# Patient Record
Sex: FEMALE | Race: WHITE
[De-identification: names, ages, dates, MRNs, and addresses within clinical notes are randomized per-mention and may not be internally consistent; named-entity substitution may affect disease eponyms.]

---

## 2018-03-09 ENCOUNTER — HOSPITAL ENCOUNTER (EMERGENCY)
Dept: HOSPITAL 56 - MW.ED | Age: 58
Discharge: HOME | End: 2018-03-09
Payer: COMMERCIAL

## 2018-03-09 DIAGNOSIS — F41.9: ICD-10-CM

## 2018-03-09 DIAGNOSIS — I10: Primary | ICD-10-CM

## 2018-03-09 LAB
CHLORIDE SERPL-SCNC: 104 MMOL/L (ref 98–107)
SODIUM SERPL-SCNC: 140 MMOL/L (ref 136–145)

## 2018-03-09 PROCEDURE — 83690 ASSAY OF LIPASE: CPT

## 2018-03-09 PROCEDURE — 71045 X-RAY EXAM CHEST 1 VIEW: CPT

## 2018-03-09 PROCEDURE — 85610 PROTHROMBIN TIME: CPT

## 2018-03-09 PROCEDURE — 85025 COMPLETE CBC W/AUTO DIFF WBC: CPT

## 2018-03-09 PROCEDURE — 80053 COMPREHEN METABOLIC PANEL: CPT

## 2018-03-09 PROCEDURE — 99285 EMERGENCY DEPT VISIT HI MDM: CPT

## 2018-03-09 PROCEDURE — 93005 ELECTROCARDIOGRAM TRACING: CPT

## 2018-03-09 PROCEDURE — 82150 ASSAY OF AMYLASE: CPT

## 2018-03-09 PROCEDURE — 84484 ASSAY OF TROPONIN QUANT: CPT

## 2018-03-09 PROCEDURE — 36415 COLL VENOUS BLD VENIPUNCTURE: CPT

## 2018-03-09 PROCEDURE — 85379 FIBRIN DEGRADATION QUANT: CPT

## 2018-03-09 RX ADMIN — NITROGLYCERIN ONE: 0.4 TABLET SUBLINGUAL at 10:04

## 2018-03-09 RX ADMIN — NITROGLYCERIN PRN MG: 0.4 TABLET SUBLINGUAL at 10:04

## 2018-03-09 RX ADMIN — NITROGLYCERIN PRN MG: 0.4 TABLET SUBLINGUAL at 10:14

## 2018-03-09 RX ADMIN — NITROGLYCERIN PRN MG: 0.4 TABLET SUBLINGUAL at 10:19

## 2018-03-09 RX ADMIN — METOROPROLOL TARTRATE SCH: 5 INJECTION, SOLUTION INTRAVENOUS at 11:06

## 2018-03-09 RX ADMIN — NITROGLYCERIN ONE MG: 0.4 TABLET SUBLINGUAL at 10:04

## 2018-03-09 NOTE — CR
EXAM DATE: 18



PATIENT'S AGE: 57



Patient: DIONE ROSADO



Facility: [place], [place]

Patient ID: [id number]

Site Patient ID: [id number].

Site Accession #: [id number]

: 1960

Study: XRay Chest [id number]/9/2018 10:42:59 AM

Ordering Physician: Paramjit Manjarrez



Final Report: 

INDICATION: Chest Pain, SOB



COMPARISON: 

Single AP view 



Findings: The lungs are clear. Pulmonary vascularity, mediastinum and cardiac 
silhouette are within normal limits. No effusions and no pneumothorax. Osseous 
structures appear unremarkable. Cervical fusion hardware noted, unchanged.



Impression: No evidence of acute cardiopulmonary disease.



Dictated by: Kishor Ricardo MD @ 2018 11:01:24

(Electronic Signature)



 

Report Signed by Proxy.
REECE

## 2018-03-09 NOTE — EDM.PDOC
ED HPI GENERAL MEDICAL PROBLEM





- General


Chief Complaint: Chest Pain


Stated Complaint: CHEST HEAVINESS


Time Seen by Provider: 03/09/18 09:55


Source of Information: Reports: Patient


History Limitations: Reports: No Limitations





- History of Present Illness


INITIAL COMMENTS - FREE TEXT/NARRATIVE: 


History of present illness:


[]Patient comes in with heaviness in her chest that has been present since 

March 6 when she had an episode of severe 10/10 chest pain lasted approximately 

15 minutes. She was working alone today and started getting extremely 

lightheaded and dizzy. This scared her and she came to ER for evaluation. She 

has not had any recurrent episodes of chest pain but has been feeling 

heaviness. Pressure but did not take her blood pressure meds this morning. 

Patient does have history of anxiety





Review of systems: 


As per history of present illness and below otherwise all systems reviewed and 

negative.





Past medical history: 


As per history of present illness and as reviewed below otherwise 

noncontributory.





Surgical history: 


As per history of present illness and as reviewed below otherwise 

noncontributory.





Social history: 


No reported history of drug or alcohol abuse.





Family history: 


As per history of present illness and as reviewed below otherwise 

noncontributory.





Physical exam:


General: Well developed, well nourished in NAD


HEENT: Atraumatic, normocephalic, pupils reactive, negative for conjunctival 

pallor or scleral icterus, mucous membranes moist, throat clear, neck supple, 

nontender, trachea midline.


Lungs: Clear to auscultation, breath sounds equal bilaterally, chest nontender.


Heart: S1S2, regular, negative for clicks, rubs, or JVD.


Abdomen: Soft, nondistended, nontender. Negative for masses or 

hepatosplenomegaly. Negative for costovertebral tenderness.


Pelvis: Stable nontender.


Genitourinary: Deferred.


Rectal: Deferred.


Extremities: Atraumatic, negative for cords or calf pain. Neurovascular 

unremarkable.


Neuro: Awake, alert, oriented. Cranial nerves II through XII unremarkable. 

Cerebellum unremarkable. Motor and sensory unremarkable throughout. Exam 

nonfocal.





Diagnostics:


[]CBC normal, chemistries normal, troponin negative, d-dimer negative, chest x-

ray normal





Therapeutics:


[]Aspirin, nitroglycerin, Lopressor given with relief of symptoms and 

improvement of blood pressure.





Impression: 


[]Uncontrolled hypertension causing chest discomfort, anxiety 





Plan:


[]Patient has an appointment with Dr. Warner in 4 days she's encouraged to keep 

this appointment for further workup of heart disease. She is to take a baby 

aspirin a day and continue her blood pressure medicines regularly. Her to get a 

blood pressure machine so that she can check this.





Definitive disposition and diagnosis as appropriate pending reevaluation and 

review of above.





  ** chest


Pain Score (Numeric/FACES): 3





- Related Data


 Allergies











Allergy/AdvReac Type Severity Reaction Status Date / Time


 


No Known Allergies Allergy   Verified 04/15/14 14:46














ED ROS GENERAL





- Review of Systems


Review Of Systems: See Below (See history of present illness)





ED EXAM, GENERAL





- Physical Exam


Exam: See Below (See history of present illness)





Course





- Vital Signs


Last Recorded V/S: 


 Last Vital Signs











Temp  98.5 F   03/09/18 09:50


 


Pulse  58 L  03/09/18 10:42


 


Resp  18   03/09/18 10:42


 


BP  120/80   03/09/18 10:42


 


Pulse Ox  99   03/09/18 10:42














- Orders/Labs/Meds


Orders: 


 Active Orders 24 hr











 Category Date Time Status


 


 Cardiac Monitoring [RC] .AS DIRECTED Care  03/09/18 09:48 Active


 


 EKG Documentation Completion [RC] STAT Care  03/09/18 09:48 Active


 


 Oxygen Therapy [RC] ASDIRECTED Care  03/09/18 09:48 Active


 


 Chest 1V Frontal [CR] Stat Exams  03/09/18 09:48 Taken


 


 UA W/MICROSCOPIC [URIN] Stat Lab  03/09/18 09:48 Ordered











Labs: 


 Laboratory Tests











  03/09/18 03/09/18 03/09/18 Range/Units





  10:00 10:00 10:00 


 


WBC  9.01    (4.0-11.0)  K/uL


 


RBC  4.41    (4.30-5.90)  M/uL


 


Hgb  14.7    (12.0-16.0)  g/dL


 


Hct  41.5    (36.0-46.0)  %


 


MCV  94.1    (80.0-98.0)  fL


 


MCH  33.3 H    (27.0-32.0)  pg


 


MCHC  35.4    (31.0-37.0)  g/dL


 


RDW Std Deviation  40.4    (28.0-62.0)  fl


 


RDW Coeff of Zahra  12    (11.0-15.0)  %


 


Plt Count  227    (150-400)  K/uL


 


MPV  8.90    (7.40-12.00)  fL


 


Neut % (Auto)  69.0    (48.0-80.0)  %


 


Lymph % (Auto)  21.9    (16.0-40.0)  %


 


Mono % (Auto)  7.2    (0.0-15.0)  %


 


Eos % (Auto)  1.6    (0.0-7.0)  %


 


Baso % (Auto)  0.3    (0.0-1.5)  %


 


Neut # (Auto)  6.2 H    (1.4-5.7)  K/uL


 


Lymph # (Auto)  2.0    (0.6-2.4)  K/uL


 


Mono # (Auto)  0.7    (0.0-0.8)  K/uL


 


Eos # (Auto)  0.1    (0.0-0.7)  K/uL


 


Baso # (Auto)  0.0    (0.0-0.1)  K/uL


 


Nucleated RBC %  0.0    /100WBC


 


Nucleated RBCs #  0    K/uL


 


INR   0.98   


 


D-Dimer, Quantitative   < 0.19   (0.0-0.52)  mg/LFEU


 


Sodium    140  (136-145)  mmol/L


 


Potassium    3.9  (3.5-5.1)  mmol/L


 


Chloride    104  ()  mmol/L


 


Carbon Dioxide    27.0  (21.0-32.0)  mmol/L


 


BUN    16  (7.0-18.0)  mg/dL


 


Creatinine    0.7  (0.6-1.0)  mg/dL


 


Est Cr Clr Drug Dosing    81.01  mL/min


 


Estimated GFR (MDRD)    > 60.0  ml/min


 


Glucose    101  ()  mg/dL


 


Calcium    1.2 L  (8.5-10.1)  mg/dL


 


Total Bilirubin    0.3  (0.2-1.0)  mg/dL


 


AST    15  (15-37)  IU/L


 


ALT    34  (14-63)  IU/L


 


Alkaline Phosphatase    40 L  ()  U/L


 


Troponin I    < 0.050  (0.000-0.056)  ng/mL


 


Total Protein    6.9  (6.4-8.2)  g/dL


 


Albumin    4.0  (3.4-5.0)  g/dL


 


Globulin    2.9  (2.0-3.5)  g/dL


 


Albumin/Globulin Ratio    1.4  (1.3-2.8)  


 


Amylase    40  ()  U/L


 


Lipase    114  ()  U/L











Meds: 


Medications














Discontinued Medications














Generic Name Dose Route Start Last Admin





  Trade Name Freq  PRN Reason Stop Dose Admin


 


Aspirin  324 mg  03/09/18 09:48  03/09/18 10:03





  Aspirin  PO  03/09/18 09:49  324 mg





  ONETIME ONE   Administration


 


Metoprolol Tartrate  5 mg  03/09/18 10:15  03/09/18 11:06





  Lopressor  IVPUSH  03/09/18 10:26  Not Given





  Q5M LOGAN   


 


Nitroglycerin  0.4 mg  03/09/18 09:48  03/09/18 10:04





  Nitrostat  SL  03/09/18 09:49  Not Given





  ONETIME ONE   


 


Nitroglycerin  0.4 mg  03/09/18 10:03  03/09/18 10:19





  Nitrostat  SL   0.4 mg





  Q5M PRN   Administration





  Chest Pain   














Departure





- Departure


Time of Disposition: 11:11


Disposition: Home, Self-Care 01


Condition: Good


Clinical Impression: 


 Uncontrolled hypertension





Forms:  ED Department Discharge





- My Orders


Last 24 Hours: 


My Active Orders





03/09/18 09:48


Cardiac Monitoring [RC] .AS DIRECTED 


EKG Documentation Completion [RC] STAT 


Oxygen Therapy [RC] ASDIRECTED 


Chest 1V Frontal [CR] Stat 


UA W/MICROSCOPIC [URIN] Stat 














- Assessment/Plan


Last 24 Hours: 


My Active Orders





03/09/18 09:48


Cardiac Monitoring [RC] .AS DIRECTED 


EKG Documentation Completion [RC] STAT 


Oxygen Therapy [RC] ASDIRECTED 


Chest 1V Frontal [CR] Stat 


UA W/MICROSCOPIC [URIN] Stat